# Patient Record
(demographics unavailable — no encounter records)

---

## 2024-11-07 NOTE — ASSESSMENT
[FreeTextEntry1] : AMOS Kong Nasal saline rise Tylenol advised in-person eval within 1-2 day uf symptoms are not improving

## 2024-11-07 NOTE — HISTORY OF PRESENT ILLNESS
[Home] : at home, [unfilled] , at the time of the visit. [Medical Office: ([facility])___] : at the medical office located in  [Verbal consent obtained from patient] : the patient, [unfilled] [FreeTextEntry8] : sinus discharge, fever, chills.

## 2024-11-07 NOTE — PHYSICAL EXAM
[No Acute Distress] : no acute distress [Normal Sclera/Conjunctiva] : normal sclera/conjunctiva [Normal Outer Ear/Nose] : the outer ears and nose were normal in appearance [No Respiratory Distress] : no respiratory distress  [No Rash] : no rash [Normal Affect] : the affect was normal

## 2024-11-07 NOTE — HISTORY OF PRESENT ILLNESS
[Home] : at home, [unfilled] , at the time of the visit. [Medical Office: ([facility])___] : at the medical office located in  [Verbal consent obtained from patient] : the patient, [unfilled] [FreeTextEntry8] : sinus discharge, fever, chills.

## 2024-11-07 NOTE — REVIEW OF SYSTEMS
[Fever] : fever [Chills] : chills [Nasal Discharge] : nasal discharge [Postnasal Drip] : postnasal drip [Negative] : Heme/Lymph

## 2025-04-15 NOTE — HISTORY OF PRESENT ILLNESS
[de-identified] : Ms. PB AUGUSTINE is a 63-year-old female with Hx of Sjogren syndrome, presenting for an annual physical exam.   Pt states she followed with rheumatology (Dr. Eliseo Damian) 1 week ago and reports her medication dosage was decreased in February d/t lab results. She reports difficulty staying active d/t fatigue and joint pain.   Pt c/o sinus infection symptoms that began 2 weeks ago. She states she visited urgent care and was started on a Zpack; Pt states she is feeling better but has lingering nasal congestion and productive cough with clear phlegm. She has been using Claritin and saline nasal spray with unchanged symptoms. Denies sinus pressure at present.  Denies any SOB, CP, abdominal pain.

## 2025-04-15 NOTE — HISTORY OF PRESENT ILLNESS
[de-identified] : Ms. PB AUGUSTINE is a 63-year-old female with Hx of Sjogren syndrome, presenting for an annual physical exam.   Pt states she followed with rheumatology (Dr. Eliseo Damian) 1 week ago and reports her medication dosage was decreased in February d/t lab results. She reports difficulty staying active d/t fatigue and joint pain.   Pt c/o sinus infection symptoms that began 2 weeks ago. She states she visited urgent care and was started on a Zpack; Pt states she is feeling better but has lingering nasal congestion and productive cough with clear phlegm. She has been using Claritin and saline nasal spray with unchanged symptoms. Denies sinus pressure at present.  Denies any SOB, CP, abdominal pain.

## 2025-04-15 NOTE — ADDENDUM
[FreeTextEntry1] : Documented by Gwen Canchola acting as a scribe for Dr. Jett. 04/15/2025   All medical record entries made by the scribe were at my, Dr. Jett, direction and personally dictated by me on 04/15/2025. I have reviewed the chart an agree that the record accurately reflects my personal performance of the history, physical exam, assessment and plan. I have also personally directed, reviewed, and agreed with the chart.

## 2025-04-15 NOTE — REVIEW OF SYSTEMS
[Negative] : Heme/Lymph [Fatigue] : fatigue [Cough] : cough [Nasal Discharge] : nasal discharge [Sore Throat] : no sore throat [FreeTextEntry4] : Rhinitis

## 2025-04-15 NOTE — HEALTH RISK ASSESSMENT
[Fair] :  ~his/her~ mood as fair [No] : No [Little interest or pleasure doing things] : 1) Little interest or pleasure doing things [Feeling down, depressed, or hopeless] : 2) Feeling down, depressed, or hopeless [0] : 2) Feeling down, depressed, or hopeless: Not at all (0) [PHQ-2 Negative - No further assessment needed] : PHQ-2 Negative - No further assessment needed [Never] : Never [Patient reported mammogram was normal] : Patient reported mammogram was normal [Patient reported PAP Smear was normal] : Patient reported PAP Smear was normal [Patient reported bone density results were normal] : Patient reported bone density results were normal [Alone] : lives alone [Retired] : retired [Graduate School] : graduate school [Single] : single [# Of Children ___] : has [unfilled] children [Feels Safe at Home] : Feels safe at home [de-identified] : Maintains active by walking and working. [de-identified] : Maintains a healthy diet.  [[id number]] : 0 [MammogramDate] : 02/2024 [MammogramComments] : Pt. declined a referral.  [PapSmearDate] : Over 21 years ago.  [PapSmearComments] : Pt. declined a referral.  [BoneDensityDate] : 2019 [ColonoscopyDate] : Never [ColonoscopyComments] : Pt. declined a referral.

## 2025-04-15 NOTE — HEALTH RISK ASSESSMENT
[Fair] :  ~his/her~ mood as fair [No] : No [Little interest or pleasure doing things] : 1) Little interest or pleasure doing things [Feeling down, depressed, or hopeless] : 2) Feeling down, depressed, or hopeless [0] : 2) Feeling down, depressed, or hopeless: Not at all (0) [PHQ-2 Negative - No further assessment needed] : PHQ-2 Negative - No further assessment needed [Never] : Never [Patient reported mammogram was normal] : Patient reported mammogram was normal [Patient reported PAP Smear was normal] : Patient reported PAP Smear was normal [Patient reported bone density results were normal] : Patient reported bone density results were normal [Alone] : lives alone [Retired] : retired [Graduate School] : graduate school [Single] : single [# Of Children ___] : has [unfilled] children [Feels Safe at Home] : Feels safe at home [de-identified] : Maintains active by walking and working. [de-identified] : Maintains a healthy diet.  [[id number]] : 0 [MammogramDate] : 02/2024 [MammogramComments] : Pt. declined a referral.  [PapSmearDate] : Over 21 years ago.  [PapSmearComments] : Pt. declined a referral.  [BoneDensityDate] : 2019 [ColonoscopyDate] : Never [ColonoscopyComments] : Pt. declined a referral.

## 2025-04-15 NOTE — PLAN
[FreeTextEntry1] : Annual Physical Exam   Health Maintenance Pt declines colonoscopy   Rhinitis, Cough  start Mucinex DM  Start Mometasone Furoate 50mcg Nasal suspension; 2 sprays into each nostril daily Pt to follow up in one week or sooner if Sx persist or worsen.  Sjogren Syndrome cont azathioprine 75 mg QD follows with Rheumatology   Bloodwork ordered.  Follow up in one week for lab results.

## 2025-06-15 NOTE — HISTORY OF PRESENT ILLNESS
[de-identified] : Ms. PB AUGUSTINE is a 63-year-old female with Hx of Sjogren syndrome, presenting for an acute visit.   Pt states she began feeling sick 4-5 days ago with symptoms of sore throat, fever, and productive cough with greenish phlegm. She reports she developed a fever of 101F yesterday and has been taking Tylenol and gargling her throat with saltwater. She was negative for COVID on a home test last night.

## 2025-06-15 NOTE — ADDENDUM
[FreeTextEntry1] : Documented by Gwen Canchola acting as a scribe for Dr. Jett. 06/13/2025   All medical record entries made by the scribe were at my, Dr. Jett, direction and personally dictated by me on 06/13/2025. I have reviewed the chart an agree that the record accurately reflects my personal performance of the history, physical exam, assessment and plan. I have also personally directed, reviewed, and agreed with the chart.

## 2025-06-15 NOTE — HISTORY OF PRESENT ILLNESS
[de-identified] : Ms. PB AUGUSTINE is a 63-year-old female with Hx of Sjogren syndrome, presenting for an acute visit.   Pt states she began feeling sick 4-5 days ago with symptoms of sore throat, fever, and productive cough with greenish phlegm. She reports she developed a fever of 101F yesterday and has been taking Tylenol and gargling her throat with saltwater. She was negative for COVID on a home test last night.

## 2025-06-15 NOTE — PLAN
[FreeTextEntry1] : Acute visit   Cough  start Zpack as directed  cont. saltwater gargles and increase po fluids  cont. Tylenol prn start Mucinex DM  Pt to follow up in one week or sooner if Sx persist or worsen.  Sjogren Syndrome cont azathioprine 75 mg QD follows with Rheumatology

## 2025-06-26 NOTE — PHYSICAL EXAM
[No Acute Distress] : no acute distress [Well Nourished] : well nourished [Well Developed] : well developed [Well-Appearing] : well-appearing [Normal Sclera/Conjunctiva] : normal sclera/conjunctiva [Normal Outer Ear/Nose] : the outer ears and nose were normal in appearance [No JVD] : no jugular venous distention [No Lymphadenopathy] : no lymphadenopathy [Supple] : supple [No Respiratory Distress] : no respiratory distress  [No Accessory Muscle Use] : no accessory muscle use [Clear to Auscultation] : lungs were clear to auscultation bilaterally [Normal Rate] : normal rate  [Regular Rhythm] : with a regular rhythm [Normal S1, S2] : normal S1 and S2 [Pedal Pulses Present] : the pedal pulses are present [No Edema] : there was no peripheral edema [No Extremity Clubbing/Cyanosis] : no extremity clubbing/cyanosis [Soft] : abdomen soft [Non Tender] : non-tender [Non-distended] : non-distended [Normal Posterior Cervical Nodes] : no posterior cervical lymphadenopathy [Normal Anterior Cervical Nodes] : no anterior cervical lymphadenopathy [No CVA Tenderness] : no CVA  tenderness [No Spinal Tenderness] : no spinal tenderness [No Joint Swelling] : no joint swelling [Grossly Normal Strength/Tone] : grossly normal strength/tone [No Rash] : no rash [Coordination Grossly Intact] : coordination grossly intact [No Focal Deficits] : no focal deficits [Normal Gait] : normal gait [Normal Affect] : the affect was normal [Normal Insight/Judgement] : insight and judgment were intact [Normal TMs] : both tympanic membranes were normal [de-identified] : + erythematous pharynx

## 2025-06-26 NOTE — ADDENDUM
[FreeTextEntry1] : Documented by Gwen Canchola acting as a scribe for Dr. Jett. 06/26/2025   All medical record entries made by the scribe were at my, Dr. Jett, direction and personally dictated by me on 06/26/2025. I have reviewed the chart an agree that the record accurately reflects my personal performance of the history, physical exam, assessment and plan. I have also personally directed, reviewed, and agreed with the chart.

## 2025-06-26 NOTE — HISTORY OF PRESENT ILLNESS
[de-identified] : Ms. PB AUGUSTINE is a 63-year-old female with Hx of Sjogren syndrome, presenting for an acute visit.  Pt was treated for cough/sore throat and prescribed Z pack 6/13 for symptoms that began 3 weeks ago. She c/o continuing sore throat / RT sided throat pain,, productive cough with clear phlegm and reports she had a fever last week. She denies fever at present, CP, or SOB.

## 2025-06-26 NOTE — PLAN
[FreeTextEntry1] : Acute visit   Pharyngitis, s/p Zpack 6/13  Rapid Strep: negative  cont. saltwater gargles and increase po fluids cont. Tylenol prn cont. Mucinex DM start doxycycline 100mg BID if symptoms worsen and avoid sun exposure  Pt to follow up in one week or sooner if Sx persist or worsen.  Sjogren Syndrome cont azathioprine 75 mg QD follows with Rheumatology.